# Patient Record
Sex: MALE | Race: WHITE | NOT HISPANIC OR LATINO | Employment: OTHER | URBAN - METROPOLITAN AREA
[De-identification: names, ages, dates, MRNs, and addresses within clinical notes are randomized per-mention and may not be internally consistent; named-entity substitution may affect disease eponyms.]

---

## 2017-10-18 ENCOUNTER — HOSPITAL ENCOUNTER (EMERGENCY)
Facility: HOSPITAL | Age: 32
Discharge: HOME | End: 2017-10-18
Payer: MEDICAID

## 2017-10-18 VITALS
SYSTOLIC BLOOD PRESSURE: 138 MMHG | BODY MASS INDEX: 23.1 KG/M2 | HEART RATE: 91 BPM | DIASTOLIC BLOOD PRESSURE: 80 MMHG | HEIGHT: 71 IN | RESPIRATION RATE: 20 BRPM | WEIGHT: 165 LBS | OXYGEN SATURATION: 100 % | TEMPERATURE: 98 F

## 2017-10-18 DIAGNOSIS — L02.415 ABSCESS OF RIGHT KNEE: ICD-10-CM

## 2017-10-18 DIAGNOSIS — L02.91 ABSCESS OF MULTIPLE SITES: Primary | ICD-10-CM

## 2017-10-18 DIAGNOSIS — L02.415 ABSCESS OF RIGHT LOWER LEG: ICD-10-CM

## 2017-10-18 DIAGNOSIS — L02.415 ABSCESS OF RIGHT THIGH: ICD-10-CM

## 2017-10-18 PROCEDURE — 25000003 PHARM REV CODE 250: Performed by: PHYSICIAN ASSISTANT

## 2017-10-18 PROCEDURE — 10061 I&D ABSCESS COMP/MULTIPLE: CPT

## 2017-10-18 PROCEDURE — 99283 EMERGENCY DEPT VISIT LOW MDM: CPT | Mod: 25

## 2017-10-18 RX ORDER — DICLOFENAC SODIUM 75 MG/1
75 TABLET, DELAYED RELEASE ORAL 2 TIMES DAILY PRN
Qty: 14 TABLET | Refills: 0 | Status: SHIPPED | OUTPATIENT
Start: 2017-10-18 | End: 2017-10-25

## 2017-10-18 RX ORDER — SULFAMETHOXAZOLE AND TRIMETHOPRIM 800; 160 MG/1; MG/1
1 TABLET ORAL
Status: COMPLETED | OUTPATIENT
Start: 2017-10-18 | End: 2017-10-18

## 2017-10-18 RX ORDER — SULFAMETHOXAZOLE AND TRIMETHOPRIM 800; 160 MG/1; MG/1
1 TABLET ORAL EVERY 12 HOURS
Qty: 20 TABLET | Refills: 0 | Status: SHIPPED | OUTPATIENT
Start: 2017-10-18 | End: 2017-10-28

## 2017-10-18 RX ORDER — HYDROCODONE BITARTRATE AND ACETAMINOPHEN 10; 325 MG/1; MG/1
1 TABLET ORAL
Status: COMPLETED | OUTPATIENT
Start: 2017-10-18 | End: 2017-10-18

## 2017-10-18 RX ORDER — LIDOCAINE HYDROCHLORIDE 10 MG/ML
10 INJECTION INFILTRATION; PERINEURAL
Status: COMPLETED | OUTPATIENT
Start: 2017-10-18 | End: 2017-10-18

## 2017-10-18 RX ADMIN — SULFAMETHOXAZOLE AND TRIMETHOPRIM 1 TABLET: 800; 160 TABLET ORAL at 07:10

## 2017-10-18 RX ADMIN — HYDROCODONE BITARTRATE AND ACETAMINOPHEN 1 TABLET: 10; 325 TABLET ORAL at 08:10

## 2017-10-18 RX ADMIN — LIDOCAINE HYDROCHLORIDE 10 ML: 10 INJECTION, SOLUTION INFILTRATION; PERINEURAL at 07:10

## 2017-10-18 NOTE — ED PROVIDER NOTES
SCRIBE #1 NOTE: I, Armen Rodriguez, am scribing for, and in the presence of, ALEXIS Rivera. I have scribed the entire note.      History      Chief Complaint   Patient presents with    Abscess     to R knee noticed them 2 days ago       Review of patient's allergies indicates:  No Known Allergies     HPI   HPI    10/18/2017, 6:51 PM   History obtained from the patient      History of Present Illness: Brian Small is a 32 y.o. male patient who presents to the Emergency Department for an evaluation of multiple skin abscesses to his right leg onset gradually 2 days ago. Pt reports associated redness to sites. Pt denies any fever, chills, drainage, weakness/numbness, and all other sx. He reports moderate pain. He states that they are getting worse. He has had Staph infections in the past. No pre-arrival treatment. Normal ROM to knee.     Arrival mode: Personal vehicle     PCP: Primary Doctor No       Past Medical History:  Past Medical History:   Diagnosis Date    Olecranon bursitis        Past Surgical History:  History reviewed. No pertinent surgical history.      Family History:  History reviewed. No pertinent surgical history.      Social History:  Social History     Social History Main Topics    Smoking status: Current Every Day Smoker    Smokeless tobacco: Never Used    Alcohol use Yes    Drug use: No    Sexual activity: Unknown       ROS   Review of Systems   Constitutional: Negative for chills, fever and unexpected weight change.   HENT: Negative for facial swelling, sore throat, trouble swallowing and voice change.    Respiratory: Negative for cough and shortness of breath.    Cardiovascular: Negative for chest pain and leg swelling.   Gastrointestinal: Negative for diarrhea, nausea and vomiting.   Endocrine: Negative for polydipsia and polyuria.   Musculoskeletal: Negative for arthralgias, gait problem and joint swelling.   Skin: Positive for color change.        (+)Abscess  (+) Redness  (-)  Drainage   Allergic/Immunologic: Negative for immunocompromised state.   Neurological: Negative for dizziness, weakness, light-headedness, numbness and headaches.   Hematological: Negative for adenopathy.   Psychiatric/Behavioral: Negative for confusion.     Physical Exam      Initial Vitals [10/18/17 1828]   BP Pulse Resp Temp SpO2   (!) 142/81 93 18 98.3 °F (36.8 °C) 100 %      MAP       101.33          Physical Exam  Nursing Notes and Vital Signs Reviewed.  Constitutional: Patient is in moderate distress. Well-developed and well-nourished.  Head: Normocephalic.  Eyes: PERRL. EOM intact. Conjunctivae are not pale. No scleral icterus.  ENT: Mucous membranes are moist.  Cardiovascular: Regular rate. Regular rhythm.  Pulmonary/Chest: No respiratory distress.  Abdominal: Soft and non-distended.   Musculoskeletal: Moves all extremities. No septic joint.     There is a cutaneous abscess noted to anterior aspect of distal right thigh; tender, erythematous, fluctuant, and measuring approximately 3cm in diameter.     There is a cutaneous abscess noted to lateral aspect of right knee; tender, erythematous, fluctuant, and pointing; measuring approximately 3 cm in diameter; already draining.     There is a cutaneous abscess noted to right lower leg; tender, erythematous, fluctuant, measuring about 1cm in diameter.    Skin: Warm and dry. No cellulitis. No lymphangitis. No necrosis.   Neurological:  Alert, awake, and appropriate. Normal speech. No acute focal neurological deficits are appreciated.  Psychiatric: Normal affect. Good eye contact. Appropriate in content.    ED Course    I & D - Incision and Drainage  Date/Time: 10/18/2017 7:47 PM  Performed by: STORMY WINSTON  Authorized by: CHRISTIANE CHIANG   Type: abscess  Body area: lower extremity (Right lateral lower leg)  Anesthesia: local infiltration    Anesthesia:  Local Anesthetic: lidocaine 1% without epinephrine  Scalpel size: 11  Incision type: single  "straight  Complexity: simple  Drainage: purulent  Drainage amount: scant  Wound treatment: incision,  drainage and  expression of material  Packing material: none  Complications: No  Specimens: No  Patient tolerance: Patient tolerated the procedure well with no immediate complications    I & D - Incision and Drainage  Date/Time: 10/18/2017 7:50 PM  Performed by: STORMY WINSTON  Authorized by: CHRISTIANE CHIANG   Type: abscess  Body area: lower extremity (Distal right thigh, superior to knee)  Anesthesia: local infiltration    Anesthesia:  Local Anesthetic: lidocaine 1% without epinephrine  Scalpel size: 11  Incision type: single straight  Complexity: complex  Drainage: purulent  Drainage amount: moderate  Wound treatment: incision,  wound packed and  deloculation  Packing material: 1/4 in gauze  Complications: No  Specimens: No  Patient tolerance: Patient tolerated the procedure well with no immediate complications    I & D - Incision and Drainage  Date/Time: 10/18/2017 8:23 PM  Performed by: STORMY WINSTON  Authorized by: CHRISTIANE CHIANG   Type: abscess  Body area: lower extremity (Lateral aspect of right knee)  Anesthesia: local infiltration    Anesthesia:  Local Anesthetic: lidocaine 1% without epinephrine  Scalpel size: 11  Incision type: single straight  Complexity: complex  Drainage: purulent  Drainage amount: moderate  Wound treatment: incision,  drainage,  wound packed and  deloculation  Packing material: 1/4 in gauze  Complications: No  Specimens: No  Patient tolerance: Patient tolerated the procedure well with no immediate complications        ED Vital Signs:  Vitals:    10/18/17 1828 10/18/17 2026   BP: (!) 142/81 138/80   Pulse: 93 91   Resp: 18 20   Temp: 98.3 °F (36.8 °C)    TempSrc: Oral    SpO2: 100% 100%   Weight: 74.8 kg (165 lb)    Height: 5' 11" (1.803 m)               The Emergency Provider reviewed the vital signs and test results, which are outlined above.    ED " Discussion     8:09 PM: Reassessed pt at this time. Pt is awake, alert, and in NAD. Pt states his condition has improved at this time. Discussed with pt all pertinent ED information. Discussed pt dx of  Abscess of right thigh and plan of tx. Gave pt all f/u and return to the ED instructions. All questions and concerns were addressed at this time. Pt expresses understanding of information and instructions, and is comfortable with plan to discharge. Pt is stable for discharge.    I discussed wound care precautions with patient and/or family/caretaker; specifically that all wounds have risk of infection despite efforts to cleanse and debride the wound; and there is a risk of an occult foreign body (and thus increased risk of infection) despite a negative examination.  I discussed with patient need to return for any signs of infection, specifically redness, increased pain, fever, drainage of pus, or any concern, immediately.      ED Medication(s):  Medications   lidocaine HCL 10 mg/ml (1%) injection 10 mL (10 mLs Infiltration Given 10/18/17 1929)   sulfamethoxazole-trimethoprim 800-160mg per tablet 1 tablet (1 tablet Oral Given 10/18/17 1930)   hydrocodone-acetaminophen 10-325mg per tablet 1 tablet (1 tablet Oral Given 10/18/17 2021)       Discharge Medication List as of 10/18/2017  8:16 PM      START taking these medications    Details   diclofenac (VOLTAREN) 75 MG EC tablet Take 1 tablet (75 mg total) by mouth 2 (two) times daily as needed (PAIN)., Starting Wed 10/18/2017, Until Wed 10/25/2017, Print      sulfamethoxazole-trimethoprim 800-160mg (BACTRIM DS) 800-160 mg Tab Take 1 tablet by mouth every 12 (twelve) hours., Starting Wed 10/18/2017, Until Sat 10/28/2017, Print             Follow-up Information     [facility] -  In 1 day.    Specialty:  Emergency Medicine  Why:  For wound re-check and packing removal.  Contact information:  [address] [facility]  [address]  [phone]                   Medical Decision Making              Scribe Attestation:   Scribe #1: I performed the above scribed service and the documentation accurately describes the services I performed. I attest to the accuracy of the note.    Attending:   Physician Attestation Statement for Scribe #1: I, ALEXIS Rivera, personally performed the services described in this documentation, as scribed by Armen Rodriguez, in my presence, and it is both accurate and complete.          Clinical Impression       ICD-10-CM ICD-9-CM   1. Abscess of multiple sites L02.91 682.9   2. Abscess of right thigh L02.415 682.6   3. Abscess of right knee L02.415 682.6   4. Abscess of right lower leg L02.415 682.6       Disposition:   Disposition: Discharged  Condition: Stable  Discussed proper wound care. Advised patient to return to the ER in 24 hours for wound re-check and packing removal.               Jasbir Cuevas PA-C  10/18/17 5801

## 2018-04-13 ENCOUNTER — HOSPITAL ENCOUNTER (EMERGENCY)
Facility: HOSPITAL | Age: 33
Discharge: HOME | End: 2018-04-13
Attending: EMERGENCY MEDICINE
Payer: MEDICAID

## 2018-04-13 VITALS
SYSTOLIC BLOOD PRESSURE: 150 MMHG | BODY MASS INDEX: 23.8 KG/M2 | RESPIRATION RATE: 18 BRPM | WEIGHT: 170 LBS | OXYGEN SATURATION: 99 % | DIASTOLIC BLOOD PRESSURE: 97 MMHG | HEIGHT: 71 IN | TEMPERATURE: 99 F | HEART RATE: 93 BPM

## 2018-04-13 DIAGNOSIS — K04.7 DENTAL ABSCESS: Primary | ICD-10-CM

## 2018-04-13 PROCEDURE — 41800 DRAINAGE OF GUM LESION: CPT

## 2018-04-13 PROCEDURE — 99283 EMERGENCY DEPT VISIT LOW MDM: CPT | Mod: 25

## 2018-04-13 PROCEDURE — 40800 DRAINAGE OF MOUTH LESION: CPT

## 2018-04-13 PROCEDURE — 63600175 PHARM REV CODE 636 W HCPCS: Performed by: EMERGENCY MEDICINE

## 2018-04-13 RX ORDER — TRAMADOL HYDROCHLORIDE 50 MG/1
50 TABLET ORAL EVERY 6 HOURS PRN
Qty: 12 TABLET | Refills: 0 | Status: SHIPPED | OUTPATIENT
Start: 2018-04-13 | End: 2019-11-02 | Stop reason: CLARIF

## 2018-04-13 RX ORDER — PENICILLIN V POTASSIUM 500 MG/1
500 TABLET, FILM COATED ORAL 4 TIMES DAILY
Qty: 40 TABLET | Refills: 0 | Status: SHIPPED | OUTPATIENT
Start: 2018-04-13 | End: 2018-04-20

## 2018-04-13 RX ADMIN — BENZOCAINE: 200 SPRAY DENTAL; ORAL; PERIODONTAL at 11:04

## 2018-04-13 NOTE — ED PROVIDER NOTES
SCRIBE #1 NOTE: I, Ollie Berrios, am scribing for, and in the presence of, Nate Jones MD. I have scribed the entire note.      History      Chief Complaint   Patient presents with    Facial Swelling     L cheek x2d       Review of patient's allergies indicates:  No Known Allergies     HPI   HPI    4/13/2018, 10:55 AM   History obtained from the patient      History of Present Illness: Brian Small is a 33 y.o. male patient who presents to the Emergency Department for left sided facial swelling which onset gradually 2 days ago. Symptoms are constant and moderate in severity.  No mitigating or exacerbating factors reported. Associated sxs include left sided dental pain. Patient denies any fever, chills, n/v/d, sore throat, drooling, dysphagia, and all other sxs at this time. Pt reports that he was unable to see his PCP, which prompted the ED visit. No further complaints or concerns at this time.         Arrival mode:  Personal vehicle    PCP: Primary Doctor No       Past Medical History:  Past Medical History:   Diagnosis Date    Olecranon bursitis        Past Surgical History:  Hx reviewed, not pertinent    Family History:  Hx reviewed, not pertinent    Social History:  Social History     Social History Main Topics    Smoking status: Current Every Day Smoker    Smokeless tobacco: Never Used    Alcohol use Yes    Drug use: No    Sexual activity: Not on file       ROS   Review of Systems   Constitutional: Negative for chills and fever.   HENT: Positive for dental problem and facial swelling (Left). Negative for drooling, sore throat and trouble swallowing.    Respiratory: Negative for shortness of breath.    Cardiovascular: Negative for chest pain.   Gastrointestinal: Negative for abdominal pain, diarrhea, nausea and vomiting.   Genitourinary: Negative for difficulty urinating and urgency.   Neurological: Negative for dizziness, weakness, light-headedness, numbness and headaches.   All other systems  reviewed and are negative.      Physical Exam      Initial Vitals [18 1029]   BP Pulse Resp Temp SpO2   (!) 150/97 93 18 98.9 °F (37.2 °C) 99 %      MAP       114.67          Physical Exam  Nursing Notes and Vital Signs Reviewed.  Constitutional: Patient is in no acute distress. Well-developed and well-nourished.  Head: Atraumatic. Normocephalic.  Eyes: PERRL. EOM intact. Conjunctivae are not pale. No scleral icterus.  ENT: Mucous membranes are moist. Oropharynx is clear and symmetric.    Mouth/Throat: Obvious left sided facial swelling. Tenderness and fluctuance noted over left lower mandible. Patient handles secretions normally. No palpable fluctuance. No evidence of periodontal or periapical abscess. No trismus.   Neck: Supple. Full ROM. No lymphadenopathy.  Cardiovascular: Regular rate. Regular rhythm. No murmurs, rubs, or gallops. Distal pulses are 2+ and symmetric.  Pulmonary/Chest: No respiratory distress. Clear to auscultation bilaterally. No wheezing or rales.  Abdominal: Soft and non-distended.  There is no tenderness.  No rebound, guarding, or rigidity. Good bowel sounds.  Musculoskeletal: Moves all extremities. No obvious deformities. No edema. No calf tenderness.  Skin: Warm and dry.  Neurological:  Alert, awake, and appropriate.  Normal speech.  No acute focal neurological deficits are appreciated.  Psychiatric: Normal affect. Good eye contact. Appropriate in content.    ED Course    I & D - Incision and Drainage  Date/Time: 2018 11:15 AM  Performed by: OZZIE LUNA  Authorized by: OZZIE LUNA   Consent Done: Yes  Consent: Verbal consent obtained.  Consent given by: patient  Patient identity confirmed: name and   Type: abscess  Body area: mouth    Anesthesia:  Local anesthetic: benzocaine 20 % mouth spray   Scalpel size: 11  Incision type: single straight  Complexity: simple  Drainage: pus and  bloody  Drainage amount: scant  Wound treatment: incision and  drainage  Complications:  "No  Specimens: No  Implants: No  Patient tolerance: Patient tolerated the procedure well with no immediate complications        ED Vital Signs:  Vitals:    04/13/18 1029   BP: (!) 150/97   Pulse: 93   Resp: 18   Temp: 98.9 °F (37.2 °C)   TempSrc: Oral   SpO2: 99%   Weight: 77.1 kg (170 lb)   Height: 5' 11" (1.803 m)            The Emergency Provider reviewed the vital signs and test results, which are outlined above.    ED Discussion     10:58 AM: Pt declines any narcotics for pain control at this time. Discussed with pt all pertinent ED information and results. Discussed pt dx and plan of tx. Gave pt all f/u and return to the ED instructions. All questions and concerns were addressed at this time. Pt expresses understanding of information and instructions, and is comfortable with plan to discharge. Pt is stable for discharge.    I discussed with patient and/or family/caretaker that evaluation in the ED does not suggest any emergent or life threatening medical conditions requiring immediate intervention beyond what was provided in the ED, and I believe patient is safe for discharge.  Regardless, an unremarkable evaluation in the ED does not preclude the development or presence of a serious of life threatening condition. As such, patient was instructed to return immediately for any worsening or change in current symptoms.      ED Medication(s):  Medications   benzocaine 20 % mouth spray ( Mouth/Throat Given 4/13/18 1104)       New Prescriptions    PENICILLIN V POTASSIUM (VEETID) 500 MG TABLET    Take 1 tablet (500 mg total) by mouth 4 (four) times daily.    TRAMADOL (ULTRAM) 50 MG TABLET    Take 1 tablet (50 mg total) by mouth every 6 (six) hours as needed for Pain.       Follow-up Information     Primary Doctor No. Schedule an appointment as soon as possible for a visit in 3 days.    Why:  Follow up with your primary doctor in the next 2-3 days for a re-check.   See your dentist ASAP as discussed.   Return to the " emergency department for any worsening signs or symptoms.                   Medical Decision Making              Scribe Attestation:   Scribe #1: I performed the above scribed service and the documentation accurately describes the services I performed. I attest to the accuracy of the note.    Attending:   Physician Attestation Statement for Scribe #1: I, Nate Jones MD, personally performed the services described in this documentation, as scribed by Ollie Berrios, in my presence, and it is both accurate and complete.          Clinical Impression       ICD-10-CM ICD-9-CM   1. Dental abscess K04.7 522.5       Disposition:   Disposition: Discharged  Condition: Stable         Nate Jones MD  04/13/18 1140

## 2018-04-16 ENCOUNTER — HOSPITAL ENCOUNTER (EMERGENCY)
Facility: HOSPITAL | Age: 33
Discharge: HOME | End: 2018-04-16
Payer: MEDICAID

## 2018-04-16 VITALS
OXYGEN SATURATION: 98 % | RESPIRATION RATE: 18 BRPM | TEMPERATURE: 98 F | SYSTOLIC BLOOD PRESSURE: 142 MMHG | HEART RATE: 86 BPM | HEIGHT: 71 IN | DIASTOLIC BLOOD PRESSURE: 96 MMHG | WEIGHT: 169.63 LBS | BODY MASS INDEX: 23.75 KG/M2

## 2018-04-16 DIAGNOSIS — R22.0 FACIAL SWELLING: ICD-10-CM

## 2018-04-16 DIAGNOSIS — K04.7 DENTAL ABSCESS: Primary | ICD-10-CM

## 2018-04-16 PROCEDURE — 25000003 PHARM REV CODE 250: Performed by: REGISTERED NURSE

## 2018-04-16 PROCEDURE — 99283 EMERGENCY DEPT VISIT LOW MDM: CPT | Mod: 25

## 2018-04-16 PROCEDURE — S0077 INJECTION, CLINDAMYCIN PHOSP: HCPCS | Performed by: REGISTERED NURSE

## 2018-04-16 PROCEDURE — 96372 THER/PROPH/DIAG INJ SC/IM: CPT

## 2018-04-16 RX ORDER — IBUPROFEN 800 MG/1
800 TABLET ORAL EVERY 6 HOURS PRN
Qty: 20 TABLET | Refills: 0 | Status: SHIPPED | OUTPATIENT
Start: 2018-04-16 | End: 2019-11-02 | Stop reason: CLARIF

## 2018-04-16 RX ORDER — CLINDAMYCIN PHOSPHATE 150 MG/ML
600 INJECTION, SOLUTION INTRAVENOUS
Status: COMPLETED | OUTPATIENT
Start: 2018-04-16 | End: 2018-04-16

## 2018-04-16 RX ORDER — CLINDAMYCIN HYDROCHLORIDE 300 MG/1
300 CAPSULE ORAL EVERY 6 HOURS
Qty: 28 CAPSULE | Refills: 0 | Status: SHIPPED | OUTPATIENT
Start: 2018-04-16 | End: 2018-04-23

## 2018-04-16 RX ADMIN — CLINDAMYCIN PHOSPHATE 600 MG: 150 INJECTION, SOLUTION INTRAMUSCULAR; INTRAVENOUS at 01:04

## 2018-04-16 NOTE — ED PROVIDER NOTES
History      Chief Complaint   Patient presents with    Dental Abscess     to L side of mouth       Review of patient's allergies indicates:  No Known Allergies     HPI   HPI    4/16/2018, 1:13 AM   History obtained from the patient      History of Present Illness: Brian Small is a 33 y.o. male patient who presents to the Emergency Department for dental pain and facial swelling which onset gradually 3 days ago. Pt was seen here on 4/13 and discharged. Pt has been taking clindamycin and tramadol for pain. Symptoms are constant and moderate in severity. No mitigating or exacerbating factors reported. Associated sxs include painful chewing. Patient denies any fever, NVD, and all other sxs at this time. No further complaints or concerns at this time.         Arrival mode: Personal vehicle      PCP: Primary Doctor No       Past Medical History:  Past Medical History:   Diagnosis Date    Olecranon bursitis        Past Surgical History:  History reviewed. No pertinent surgical history.      Family History:  Family History   Problem Relation Age of Onset    No Known Problems Mother     No Known Problems Father        Social History:  Social History     Social History Main Topics    Smoking status: Current Every Day Smoker    Smokeless tobacco: Never Used    Alcohol use Yes    Drug use: No    Sexual activity: Not on file       ROS   Review of Systems   Constitutional: Negative for fever.   HENT: Positive for dental problem and facial swelling. Negative for sore throat.    Respiratory: Negative for shortness of breath.    Cardiovascular: Negative for chest pain.   Gastrointestinal: Negative for nausea.   Genitourinary: Negative for dysuria.   Musculoskeletal: Negative for back pain.   Skin: Negative for rash.   Neurological: Negative for weakness.   Hematological: Does not bruise/bleed easily.   All other systems reviewed and are negative.      Physical Exam      Initial Vitals [04/16/18 0109]   BP Pulse Resp  "Temp SpO2   (!) 163/94 83 18 97.7 °F (36.5 °C) 99 %      MAP       117          Physical Exam  Nursing Notes and Vital Signs Reviewed.  Constitutional: Patient is in no acute distress. Well-developed and well-nourished.  Head: Atraumatic. Normocephalic.  Eyes: PERRL. EOM intact. Conjunctivae are not pale. No scleral icterus.  ENT: Mucous membranes are moist. Oropharynx is clear and symmetric.    Mouth/Throat: L lower facial swelling. Patient handles secretions normally. Positive for dental caries. Positive for gingival edema. No palpable fluctuance. No trismus.   Neck: Supple. Full ROM. No lymphadenopathy.  Cardiovascular: Regular rate. Regular rhythm. No murmurs, rubs, or gallops. Distal pulses are 2+ and symmetric.  Pulmonary/Chest: No respiratory distress. Clear to auscultation bilaterally. No wheezing or rales.  Abdominal: Soft and non-distended.  There is no tenderness.  No rebound, guarding, or rigidity. Good bowel sounds.  Genitourinary: No CVA tenderness  Musculoskeletal: Moves all extremities. No obvious deformities. No edema. No calf tenderness.  Skin: Warm and dry.  Neurological:  Alert, awake, and appropriate.  Normal speech.  No acute focal neurological deficits are appreciated.  Psychiatric: Normal affect. Good eye contact. Appropriate in content.    ED Course    Procedures  ED Vital Signs:  Vitals:    04/16/18 0109 04/16/18 0142   BP: (!) 163/94 (!) 142/96   Pulse: 83 86   Resp: 18 18   Temp: 97.7 °F (36.5 °C)    TempSrc: Oral    SpO2: 99% 98%   Weight: 76.9 kg (169 lb 10.3 oz)    Height: 5' 11" (1.803 m)        Abnormal Lab Results:  Labs Reviewed - No data to display     All Lab Results:      Imaging Results:  Imaging Results    None                 The Emergency Provider reviewed the vital signs and test results, which are outlined above.    ED Discussion     1:18 AM:  Discussed with pt all pertinent ED information and results. Discussed pt dx and plan of tx. Gave pt all f/u and return to the ED " instructions. All questions and concerns were addressed at this time. Pt expresses understanding of information and instructions, and is comfortable with plan to discharge. Pt is stable for discharge.    Pre-hypertension/Hypertension: The pt has been informed that they may have pre-hypertension or hypertension based on a blood pressure reading in the ED. I recommend that the pt call the PCP listed on their discharge instructions or a physician of their choice this week to arrange f/u for further evaluation of possible pre-hypertension or hypertension.           ED Medication(s):  Medications   clindamycin injection 600 mg (600 mg Intramuscular Given 4/16/18 0121)       Discharge Medication List as of 4/16/2018  1:19 AM      START taking these medications    Details   clindamycin (CLEOCIN) 300 MG capsule Take 1 capsule (300 mg total) by mouth every 6 (six) hours., Starting Mon 4/16/2018, Until Mon 4/23/2018, Print             Follow-up Information     Dentist of your choosing In 1 week.                   Medical Decision Making                        Clinical Impression       ICD-10-CM ICD-9-CM   1. Dental abscess K04.7 522.5   2. Facial swelling R22.0 784.2               Diony Jorge Jr., FNP  04/16/18 1200

## 2019-11-02 ENCOUNTER — HOSPITAL ENCOUNTER (EMERGENCY)
Facility: HOSPITAL | Age: 34
Discharge: HOME | End: 2019-11-02
Attending: FAMILY MEDICINE
Payer: MEDICAID

## 2019-11-02 VITALS
TEMPERATURE: 98 F | BODY MASS INDEX: 22.5 KG/M2 | OXYGEN SATURATION: 99 % | HEART RATE: 81 BPM | RESPIRATION RATE: 18 BRPM | WEIGHT: 160.69 LBS | SYSTOLIC BLOOD PRESSURE: 137 MMHG | DIASTOLIC BLOOD PRESSURE: 80 MMHG | HEIGHT: 71 IN

## 2019-11-02 DIAGNOSIS — K04.7 DENTAL ABSCESS: Primary | ICD-10-CM

## 2019-11-02 DIAGNOSIS — R03.0 ELEVATED BLOOD PRESSURE READING: ICD-10-CM

## 2019-11-02 DIAGNOSIS — K08.89 DENTALGIA: ICD-10-CM

## 2019-11-02 PROCEDURE — 99284 EMERGENCY DEPT VISIT MOD MDM: CPT

## 2019-11-02 RX ORDER — IBUPROFEN 800 MG/1
800 TABLET ORAL 3 TIMES DAILY PRN
Qty: 20 TABLET | Refills: 0 | Status: SHIPPED | OUTPATIENT
Start: 2019-11-02

## 2019-11-02 RX ORDER — PENICILLIN V POTASSIUM 500 MG/1
500 TABLET, FILM COATED ORAL 4 TIMES DAILY
Qty: 28 TABLET | Refills: 0 | Status: SHIPPED | OUTPATIENT
Start: 2019-11-02 | End: 2019-11-09

## 2019-11-02 NOTE — ED PROVIDER NOTES
"   History      Chief Complaint   Patient presents with    Dental Pain     pt states, "I have an abscess," pt c/o R lower jaw dental pain, x4 days       Review of patient's allergies indicates:  No Known Allergies     HPI   HPI    11/2/2019, 11:16 AM   History obtained from the patient      History of Present Illness: Brian Small is a 34 y.o. male patient who presents to the Emergency Department for dental pain x 3-4 days.  Patient complains of right lower dental pain.  Patient states that he has been told he needs to have a tooth pulled but has yet to schedule appointment.  Patient states that he does not have a dentist.  Denies fever, vomiting, diarrhea, chest pain, SOB, headache, dizziness, difficulty swallowing, difficulty breathing.  Patient reports improvement in pain and swelling with Ibuprofen.        Arrival mode: Personal vehicle      PCP: Primary Doctor No       Past Medical History:  Past Medical History:   Diagnosis Date    Olecranon bursitis        Past Surgical History:  History reviewed. No pertinent surgical history.      Family History:  Family History   Problem Relation Age of Onset    No Known Problems Mother     No Known Problems Father        Social History:  Social History     Tobacco Use    Smoking status: Current Some Day Smoker     Types: Cigarettes    Smokeless tobacco: Never Used   Substance and Sexual Activity    Alcohol use: Yes    Drug use: No    Sexual activity: Not on file       ROS   Review of Systems   Constitutional: Negative for chills and fever.   HENT: Positive for dental problem and facial swelling. Negative for congestion and rhinorrhea.    Eyes: Negative for discharge and redness.   Respiratory: Negative for cough and wheezing.    Cardiovascular: Negative for chest pain and palpitations.   Gastrointestinal: Negative for diarrhea, nausea and vomiting.   Genitourinary: Negative for dysuria and frequency.   Musculoskeletal: Negative for back pain and neck pain. " "  Skin: Negative for rash and wound.   Neurological: Negative for dizziness and headaches.       Physical Exam      Initial Vitals [11/02/19 1109]   BP Pulse Resp Temp SpO2   137/80 81 18 97.8 °F (36.6 °C) 99 %      MAP       --          Physical Exam  Nursing Notes and Vital Signs Reviewed.  Constitutional: Patient is in no apparent distress. Awake and alert. Well-developed and well-nourished.  Head: Atraumatic. Normocephalic.  Eyes: PERRL. EOM intact. Conjunctivae are not pale. No scleral icterus.  ENT: Mucous membranes are moist. Oropharynx is clear and symmetric.  Uvula midline.  Multiple dental caries.  Handling secretions without difficulty.  Mild swelling of right cheek.  No erythema or swelling of gingiva.    Neck: Supple. Full ROM. No lymphadenopathy.  Cardiovascular: Regular rate. Regular rhythm. No murmurs, rubs, or gallops. Distal pulses are 2+ and symmetric.  Pulmonary/Chest: No respiratory distress. Clear to auscultation bilaterally. No wheezing, rales, or rhonchi.  Abdominal: Soft and non-distended.  There is no tenderness.  No rebound, guarding, or rigidity.   Musculoskeletal: Moves all extremities. No obvious deformities. No edema. No calf tenderness.  Skin: Warm and dry.  Neurological:  Alert, awake, and appropriate.  Normal speech.  No acute focal neurological deficits are appreciated.  Psychiatric: Normal affect. Good eye contact. Appropriate in content.    ED Course    Procedures  ED Vital Signs:  Vitals:    11/02/19 1109   BP: 137/80   Pulse: 81   Resp: 18   Temp: 97.8 °F (36.6 °C)   TempSrc: Oral   SpO2: 99%   Weight: 72.9 kg (160 lb 11.5 oz)   Height: 5' 11" (1.803 m)       Abnormal Lab Results:  Labs Reviewed - No data to display     All Lab Results:  None    Imaging Results:  Imaging Results    None                 The Emergency Provider reviewed the vital signs and test results, which are outlined above.    ED Discussion     11:22 AM:  Discussed with pt all pertinent ED information and " results. Discussed pt dx and plan of tx. Gave pt all f/u and return to the ED instructions. All questions and concerns were addressed at this time. Pt expresses understanding of information and instructions, and is comfortable with plan to discharge. Pt is stable for discharge.    I discussed with patient and/or family/caretaker that evaluation in the ED does not suggest any emergent or life threatening medical conditions requiring immediate intervention beyond what was provided in the ED, and I believe patient is safe for discharge.  Regardless, an unremarkable evaluation in the ED does not preclude the development or presence of a serious of life threatening condition. As such, patient was instructed to return immediately for any worsening or change in current symptoms.    Pre-hypertension/Hypertension: The pt has been informed that they may have pre-hypertension or hypertension based on a blood pressure reading in the ED. I recommend that the pt call the PCP listed on their discharge instructions or a physician of their choice this week to arrange f/u for further evaluation of possible pre-hypertension or hypertension.       ED Medication(s):  Medications - No data to display    New Prescriptions    IBUPROFEN (ADVIL,MOTRIN) 800 MG TABLET    Take 1 tablet (800 mg total) by mouth 3 (three) times daily as needed for Pain.    PENICILLIN V POTASSIUM (VEETID) 500 MG TABLET    Take 1 tablet (500 mg total) by mouth 4 (four) times daily. for 7 days       Follow-up Information     [facility] Schedule an appointment as soon as possible for a visit in 3 days.    Why:  Dentist  Contact information:  [address]  [place] [address]    Phone:  [phone]           [facility] - .    Specialty:  Emergency Medicine  Why:  If symptoms worsen  Contact information:  [address] [facility] [address] [phone]                   Medical Decision Making                   Clinical Impression       ICD-10-CM ICD-9-CM   1. Dental abscess K04.7 522.5   2. Dentalgia K08.89 525.9   3. Elevated blood pressure reading R03.0 796.2       Disposition:   Disposition: Discharged  Condition: Stable           Nafisa Del Castillo PA-C  11/02/19 1127

## 2019-11-02 NOTE — ED NOTES
Patient examined, evaluated, and educated on discharge instructions and prescriptions by ALEXIS Skelton, without nursing assistance. Patient discharge to lobby by provider.

## 2021-08-26 ENCOUNTER — HOSPITAL ENCOUNTER (EMERGENCY)
Facility: HOSPITAL | Age: 36
Discharge: HOME | End: 2021-08-26
Attending: FAMILY MEDICINE
Payer: MEDICAID

## 2021-08-26 VITALS
DIASTOLIC BLOOD PRESSURE: 69 MMHG | WEIGHT: 153 LBS | SYSTOLIC BLOOD PRESSURE: 114 MMHG | HEART RATE: 73 BPM | BODY MASS INDEX: 21.42 KG/M2 | TEMPERATURE: 98 F | RESPIRATION RATE: 18 BRPM | OXYGEN SATURATION: 99 % | HEIGHT: 71 IN

## 2021-08-26 DIAGNOSIS — M70.22 OLECRANON BURSITIS OF LEFT ELBOW: Primary | ICD-10-CM

## 2021-08-26 PROCEDURE — 99283 EMERGENCY DEPT VISIT LOW MDM: CPT

## 2021-08-26 RX ORDER — SULFAMETHOXAZOLE AND TRIMETHOPRIM 800; 160 MG/1; MG/1
1 TABLET ORAL 2 TIMES DAILY
Qty: 14 TABLET | Refills: 0 | Status: SHIPPED | OUTPATIENT
Start: 2021-08-26 | End: 2021-09-02

## 2022-11-03 ENCOUNTER — HOSPITAL ENCOUNTER (EMERGENCY)
Facility: HOSPITAL | Age: 37
Discharge: HOME | End: 2022-11-03
Attending: EMERGENCY MEDICINE
Payer: MEDICAID

## 2022-11-03 VITALS
BODY MASS INDEX: 22.32 KG/M2 | OXYGEN SATURATION: 100 % | SYSTOLIC BLOOD PRESSURE: 120 MMHG | DIASTOLIC BLOOD PRESSURE: 84 MMHG | RESPIRATION RATE: 19 BRPM | HEART RATE: 78 BPM | TEMPERATURE: 98 F | WEIGHT: 160 LBS

## 2022-11-03 DIAGNOSIS — S99.929A FOOT INJURY: ICD-10-CM

## 2022-11-03 DIAGNOSIS — S91.332A PUNCTURE WOUND TO FOOT, LEFT, INITIAL ENCOUNTER: Primary | ICD-10-CM

## 2022-11-03 PROCEDURE — 90714 TD VACC NO PRESV 7 YRS+ IM: CPT | Performed by: NURSE PRACTITIONER

## 2022-11-03 PROCEDURE — 90471 IMMUNIZATION ADMIN: CPT | Performed by: NURSE PRACTITIONER

## 2022-11-03 PROCEDURE — 63600175 PHARM REV CODE 636 W HCPCS: Performed by: NURSE PRACTITIONER

## 2022-11-03 PROCEDURE — 99284 EMERGENCY DEPT VISIT MOD MDM: CPT

## 2022-11-03 RX ORDER — KETOROLAC TROMETHAMINE 10 MG/1
10 TABLET, FILM COATED ORAL 3 TIMES DAILY
Qty: 15 TABLET | Refills: 0 | Status: SHIPPED | OUTPATIENT
Start: 2022-11-03 | End: 2022-11-08

## 2022-11-03 RX ORDER — LEVOFLOXACIN 500 MG/1
500 TABLET, FILM COATED ORAL DAILY
Qty: 5 TABLET | Refills: 0 | Status: SHIPPED | OUTPATIENT
Start: 2022-11-03 | End: 2022-11-08

## 2022-11-03 RX ADMIN — CLOSTRIDIUM TETANI TOXOID ANTIGEN (FORMALDEHYDE INACTIVATED) AND CORYNEBACTERIUM DIPHTHERIAE TOXOID ANTIGEN (FORMALDEHYDE INACTIVATED) 0.5 ML: 5; 2 INJECTION, SUSPENSION INTRAMUSCULAR at 01:11

## 2022-11-03 NOTE — Clinical Note
"Brian"Marino Small was seen and treated in our emergency department on 11/3/2022.  He may return to work on 11/05/2022.       If you have any questions or concerns, please don't hesitate to call.      Ricardo Marcum NP"

## 2022-11-03 NOTE — ED NOTES
Patient identifiers verified and correct for Brian JEREMIE Small.    Pt present to er with nail puncture to l foot.    LOC: The patient is awake, alert and aware of environment with an appropriate affect, the patient is oriented x 3 and speaking appropriately.  APPEARANCE: Patient resting comfortably and in no acute distress, patient is clean and well groomed, patient's clothing is properly fastened.  SKIN: The skin is warm and dry, color consistent with ethnicity, patient has normal skin turgor and moist mucus membranes, skin intact, no breakdown or bruising noted.  MUSCULOSKELETAL: Patient moving all extremities spontaneously.  RESPIRATORY: Airway is open and patent, respirations are spontaneous.  CARDIAC: Patient has a normal rate, no periphreal edema noted, capillary refill < 3 seconds.  ABDOMEN: Soft and non tender to palpation.

## 2022-11-06 NOTE — ED PROVIDER NOTES
Encounter Date: 11/3/2022       History     Chief Complaint   Patient presents with    Foot Pain     Left foot pain after stepping on a nail yesterday; redness and swelling to left foot. Unknown last tetanus shot.      Patient complains of left foot pain after stepping on a nail yesterday that went through his boot.    The history is provided by the patient.   Foot Pain  This is a new problem. The current episode started yesterday. The problem occurs constantly. The problem has not changed since onset.Pertinent negatives include no chest pain and no shortness of breath. Nothing aggravates the symptoms. Nothing relieves the symptoms. He has tried nothing for the symptoms.   Review of patient's allergies indicates:  No Known Allergies  Past Medical History:   Diagnosis Date    Olecranon bursitis      No past surgical history on file.  Family History   Problem Relation Age of Onset    No Known Problems Mother     No Known Problems Father      Social History     Tobacco Use    Smoking status: Some Days     Types: Cigarettes    Smokeless tobacco: Never   Substance Use Topics    Alcohol use: Yes    Drug use: No     Review of Systems   Constitutional:  Negative for fever.   HENT:  Negative for sore throat.    Respiratory:  Negative for shortness of breath.    Cardiovascular:  Negative for chest pain.   Gastrointestinal:  Negative for nausea.   Genitourinary:  Negative for dysuria.   Musculoskeletal:  Negative for back pain.   Skin:  Negative for rash.   Neurological:  Negative for weakness.   Hematological:  Does not bruise/bleed easily.     Physical Exam     Initial Vitals [11/03/22 1306]   BP Pulse Resp Temp SpO2   120/84 78 19 97.8 °F (36.6 °C) 100 %      MAP       --         Physical Exam    Nursing note and vitals reviewed.  Constitutional: He appears well-developed and well-nourished.   HENT:   Head: Normocephalic and atraumatic.   Eyes: Conjunctivae are normal. Pupils are equal, round, and reactive to light.   Neck:  Neck supple.   Normal range of motion.  Cardiovascular:  Normal rate, regular rhythm, normal heart sounds and intact distal pulses.           Pulmonary/Chest: Breath sounds normal.   Abdominal: Abdomen is soft. There is no rebound and no guarding.   Musculoskeletal:         General: Normal range of motion.      Cervical back: Normal range of motion and neck supple.     Neurological: He is alert.   Skin: Skin is warm and dry.   Psychiatric: He has a normal mood and affect. His behavior is normal. Thought content normal.       ED Course   Procedures  Labs Reviewed - No data to display       Imaging Results              X-Ray Foot Complete Left (Final result)  Result time 11/03/22 13:48:26      Final result by Nikita Bearden MD (11/03/22 13:48:26)                   Impression:      No acute finding involving the left foot.      Electronically signed by: Nikita Bearden  Date:    11/03/2022  Time:    13:48               Narrative:    EXAMINATION:  XR FOOT COMPLETE 3 VIEW LEFT    CLINICAL HISTORY:  .  Unspecified injury of unspecified foot, initial encounter    TECHNIQUE:  AP, lateral and oblique views of the left foot were performed.    COMPARISON:  None available.    FINDINGS:  Three views of the left foot.    No acute fracture or dislocation.  The Lisfranc joint is maintained.                                       Medications   tetanus and diphther. tox (PF)(TD) (0.5 mLs Intramuscular Given 11/3/22 1320)                              Clinical Impression:   Final diagnoses:  [S99.929A] Foot injury  [S99.929A] Foot injury - nail puncture  [S91.332A] Puncture wound to foot, left, initial encounter (Primary)        ED Disposition Condition    Discharge Stable          ED Prescriptions       Medication Sig Dispense Start Date End Date Auth. Provider    levoFLOXacin (LEVAQUIN) 500 MG tablet Take 1 tablet (500 mg total) by mouth once daily. for 5 days 5 tablet 11/3/2022 11/8/2022 Ricardo Marcum NP    ketorolac (TORADOL)  10 mg tablet Take 1 tablet (10 mg total) by mouth 3 (three) times daily. for 5 days 15 tablet 11/3/2022 11/8/2022 Ricardo Marcum NP          Follow-up Information       Follow up With Specialties Details Why Contact Info    pcp  Schedule an appointment as soon as possible for a visit  As needed              Ricardo Marcum NP  11/06/22 1121       Ricardo Marcum NP  11/06/22 1122

## 2023-02-27 ENCOUNTER — HOSPITAL ENCOUNTER (EMERGENCY)
Facility: HOSPITAL | Age: 38
Discharge: HOME | End: 2023-02-27
Attending: EMERGENCY MEDICINE
Payer: MEDICAID

## 2023-02-27 VITALS
DIASTOLIC BLOOD PRESSURE: 69 MMHG | RESPIRATION RATE: 16 BRPM | OXYGEN SATURATION: 98 % | HEART RATE: 84 BPM | TEMPERATURE: 99 F | HEIGHT: 71 IN | SYSTOLIC BLOOD PRESSURE: 119 MMHG | WEIGHT: 163.13 LBS | BODY MASS INDEX: 22.84 KG/M2

## 2023-02-27 DIAGNOSIS — L03.116 CELLULITIS OF LEFT LOWER EXTREMITY: Primary | ICD-10-CM

## 2023-02-27 PROCEDURE — 25000003 PHARM REV CODE 250: Performed by: NURSE PRACTITIONER

## 2023-02-27 PROCEDURE — 99283 EMERGENCY DEPT VISIT LOW MDM: CPT

## 2023-02-27 RX ORDER — CLINDAMYCIN PHOSPHATE 150 MG/ML
600 INJECTION, SOLUTION INTRAVENOUS
Status: DISCONTINUED | OUTPATIENT
Start: 2023-02-27 | End: 2023-02-27 | Stop reason: RX

## 2023-02-27 RX ORDER — CLINDAMYCIN HYDROCHLORIDE 150 MG/1
450 CAPSULE ORAL 3 TIMES DAILY
Qty: 63 CAPSULE | Refills: 0 | Status: SHIPPED | OUTPATIENT
Start: 2023-02-27 | End: 2023-03-06

## 2023-02-27 RX ORDER — CLINDAMYCIN HYDROCHLORIDE 150 MG/1
600 CAPSULE ORAL
Status: COMPLETED | OUTPATIENT
Start: 2023-02-27 | End: 2023-02-27

## 2023-02-27 RX ADMIN — CLINDAMYCIN HYDROCHLORIDE 600 MG: 150 CAPSULE ORAL at 07:02

## 2023-02-28 NOTE — ED PROVIDER NOTES
HISTORY     Chief Complaint   Patient presents with    Leg Swelling     Pt arrives c/o left left swelling to anterior shin. Recently injured site with , not treated with antibiotics after injury. +Redness/swelling/warmth to touch. Ambulatory with steady gait noted. +PMS. Denies fevers/chills.     Review of patient's allergies indicates:  No Known Allergies     HPI   37-year-old male presents to the ER for redness to his left lower leg which has been ongoing times several days.  Patient reports recent injury with a lawnmower blade.  Patient reports his Tdap is up-to-date.  Patient denies any previous treatment.  Patient denies any fever, chills, abdominal pain, nausea, vomiting, chest pain, shortness of breath, and all other concerns at this time.    The history is provided by the patient. No  was used.      PCP: Primary Doctor No     Past Medical History:  Past Medical History:   Diagnosis Date    Olecranon bursitis         Past Surgical History:  No past surgical history on file.     Family History:  Family History   Problem Relation Age of Onset    No Known Problems Mother     No Known Problems Father         Social History:  Social History     Tobacco Use    Smoking status: Some Days     Types: Cigarettes    Smokeless tobacco: Never   Substance and Sexual Activity    Alcohol use: Yes    Drug use: No    Sexual activity: Not on file         ROS   Review of Systems   Constitutional:  Negative for fever.   HENT:  Negative for sore throat.    Respiratory:  Negative for shortness of breath.    Cardiovascular:  Negative for chest pain.   Gastrointestinal:  Negative for abdominal pain, nausea and vomiting.   Genitourinary:  Negative for dysuria.   Musculoskeletal:  Negative for back pain.   Skin:  Positive for wound. Negative for rash.   Neurological:  Negative for weakness.   Hematological:  Does not bruise/bleed easily.     PHYSICAL EXAM     Initial Vitals [02/27/23 1908]   BP Pulse  "Resp Temp SpO2   119/69 84 16 98.7 °F (37.1 °C) 98 %      MAP       --           Physical Exam    Nursing note and vitals reviewed.  Constitutional: He appears well-developed and well-nourished. He is not diaphoretic. No distress.   HENT:   Head: Normocephalic and atraumatic.   Eyes: Right eye exhibits no discharge. Left eye exhibits no discharge.   Neck: Neck supple.   Normal range of motion.  Cardiovascular:  Normal rate.           Pulmonary/Chest: No respiratory distress.   Abdominal: He exhibits no distension.   Musculoskeletal:         General: Normal range of motion.      Cervical back: Normal range of motion and neck supple.     Neurological: He is alert and oriented to person, place, and time. He has normal strength.   Skin: Skin is warm and dry.   5 cm area of erythema noted to the anterior left lower leg.  There is no fluctuance.  There is no drainage.   Psychiatric: He has a normal mood and affect. His behavior is normal. Thought content normal.        ED COURSE   Procedures  ED ONGOING VITALS:  Vitals:    02/27/23 1906 02/27/23 1908   BP:  119/69   Pulse:  84   Resp:  16   Temp:  98.7 °F (37.1 °C)   TempSrc:  Oral   SpO2:  98%   Weight:  74 kg (163 lb 2.3 oz)   Height: 5' 11" (1.803 m)          ABNORMAL LAB VALUES:  Labs Reviewed - No data to display      ALL LAB VALUES:  none      RADIOLOGY STUDIES:  Imaging Results    None                   The above vital signs and test results have been reviewed by the emergency provider.     ED Medications:  Medications   clindamycin capsule 600 mg (600 mg Oral Given 2/27/23 1941)       Discharge Medication List as of 2/27/2023  8:20 PM        START taking these medications    Details   clindamycin (CLEOCIN) 150 MG capsule Take 3 capsules (450 mg total) by mouth 3 (three) times daily. for 7 days, Starting Mon 2/27/2023, Until Mon 3/6/2023, Print           Discharge Medications:  Discharge Medication List as of 2/27/2023  8:20 PM        START taking these medications "    Details   clindamycin (CLEOCIN) 150 MG capsule Take 3 capsules (450 mg total) by mouth 3 (three) times daily. for 7 days, Starting Mon 2/27/2023, Until Mon 3/6/2023, Print             Follow-up Information       pcp of choice.    Why: As needed             [facility] - Emergency Dept..    Specialty: Emergency Medicine  Why: If symptoms worsen  Contact information:  [address] [facility] [address] [phone]                          I discussed with patient and/or family/caretaker that evaluation in the ED does not suggest any emergent or life threatening medical conditions requiring immediate intervention beyond what was provided in the ED, and I believe patient is safe for discharge. Regardless, an unremarkable evaluation in the ED does not preclude the development or presence of a serious or life threatening condition. As such, patient was instructed to return immediately for any worsening or change in current symptoms.      MEDICAL DECISION MAKING   Medical Decision Making:   Initial Assessment:   Patient presents the ER for erythema noted to the left lower extremity consistent with cellulitis.  Differential Diagnosis:   Abscess  Urticaria  ED Management:  Patient has symptoms consistent with cellulitis.  Will treat patient outpatient with clindamycin.  Patient to follow with PCP and return to the ER for any worsening concerns.             CLINICAL IMPRESSION       ICD-10-CM ICD-9-CM   1. Cellulitis of left lower extremity  L03.116 682.6       Disposition:   Disposition: Discharged  Condition: Stable       Nirav Winchester NP  02/27/23 2110

## 2023-05-16 ENCOUNTER — HOSPITAL ENCOUNTER (EMERGENCY)
Facility: HOSPITAL | Age: 38
Discharge: HOME | End: 2023-05-16
Attending: EMERGENCY MEDICINE
Payer: MEDICAID

## 2023-05-16 VITALS
HEART RATE: 77 BPM | OXYGEN SATURATION: 100 % | WEIGHT: 162.06 LBS | RESPIRATION RATE: 16 BRPM | HEIGHT: 71 IN | BODY MASS INDEX: 22.69 KG/M2 | DIASTOLIC BLOOD PRESSURE: 84 MMHG | TEMPERATURE: 99 F | SYSTOLIC BLOOD PRESSURE: 118 MMHG

## 2023-05-16 DIAGNOSIS — L03.115 CELLULITIS OF RIGHT KNEE: Primary | ICD-10-CM

## 2023-05-16 PROCEDURE — 99283 EMERGENCY DEPT VISIT LOW MDM: CPT

## 2023-05-16 PROCEDURE — 25000003 PHARM REV CODE 250: Performed by: REGISTERED NURSE

## 2023-05-16 RX ORDER — SULFAMETHOXAZOLE AND TRIMETHOPRIM 800; 160 MG/1; MG/1
1 TABLET ORAL
Status: COMPLETED | OUTPATIENT
Start: 2023-05-16 | End: 2023-05-16

## 2023-05-16 RX ORDER — SULFAMETHOXAZOLE AND TRIMETHOPRIM 800; 160 MG/1; MG/1
1 TABLET ORAL 2 TIMES DAILY
Qty: 14 TABLET | Refills: 0 | Status: SHIPPED | OUTPATIENT
Start: 2023-05-16 | End: 2023-05-23

## 2023-05-16 RX ADMIN — SULFAMETHOXAZOLE AND TRIMETHOPRIM 1 TABLET: 800; 160 TABLET ORAL at 05:05

## 2023-05-16 NOTE — ED PROVIDER NOTES
Encounter Date: 5/16/2023       History     Chief Complaint   Patient presents with    Wound Infection     Pt reports wound to right knee X 3-4 months.     38-year-old male presents emergency department with complaints of wound to the right knee that began several days ago.  Patient reports working on his knees prior to infection.  Patient states that a sac burst on the knee and has been draining.  Patient denies any fever, chills, nausea/vomiting, chest pain or any other symptoms at this time.    The history is provided by the patient.   Review of patient's allergies indicates:  No Known Allergies  Past Medical History:   Diagnosis Date    Olecranon bursitis      No past surgical history on file.  Family History   Problem Relation Age of Onset    No Known Problems Mother     No Known Problems Father      Social History     Tobacco Use    Smoking status: Some Days     Types: Cigarettes    Smokeless tobacco: Never   Substance Use Topics    Alcohol use: Yes    Drug use: No     Review of Systems   Constitutional:  Negative for fever.   HENT:  Negative for sore throat.    Respiratory:  Negative for shortness of breath.    Cardiovascular:  Negative for chest pain.   Gastrointestinal:  Negative for nausea.   Genitourinary:  Negative for dysuria.   Musculoskeletal:  Positive for arthralgias. Negative for back pain.   Skin:  Negative for rash.   Neurological:  Negative for weakness.   Hematological:  Does not bruise/bleed easily.   All other systems reviewed and are negative.    Physical Exam     Initial Vitals [05/16/23 1652]   BP Pulse Resp Temp SpO2   118/84 77 16 98.5 °F (36.9 °C) 100 %      MAP       --         Physical Exam    Constitutional: He appears well-developed and well-nourished. No distress.   HENT:   Head: Normocephalic and atraumatic.   Nose: Nose normal.   Mouth/Throat: Uvula is midline and oropharynx is clear and moist.   Eyes: Conjunctivae and EOM are normal. Pupils are equal, round, and reactive to  light.   Neck: Neck supple.   Normal range of motion.  Cardiovascular:  Normal rate and regular rhythm.           Pulmonary/Chest: Effort normal and breath sounds normal. No respiratory distress. He has no decreased breath sounds. He has no wheezes. He has no rales.   Abdominal: Abdomen is soft. Bowel sounds are normal. There is no abdominal tenderness.   Musculoskeletal:         General: Normal range of motion.      Cervical back: Normal range of motion and neck supple.        Legs:       Comments: 3 centimeter area of erythema with drainage to the right anterior lower extremity, full range of motion knee, no calf tenderness, negative Homans sign     Neurological: He is alert and oriented to person, place, and time. He has normal strength. GCS eye subscore is 4. GCS verbal subscore is 5. GCS motor subscore is 6.   Skin: Skin is warm and dry. Capillary refill takes less than 2 seconds. No rash noted.   Psychiatric: He has a normal mood and affect. His speech is normal and behavior is normal.       ED Course   Procedures  Labs Reviewed - No data to display       Imaging Results    None          Medications   sulfamethoxazole-trimethoprim 800-160mg per tablet 1 tablet (has no administration in time range)     Medical Decision Making:   ED Management:  Cellulitis to the right knee with drainage, will treat with Bactrim DS.  Return the emergency department for any worsening symptoms.                        Clinical Impression:   Final diagnoses:  [L03.115] Cellulitis of right knee (Primary)        ED Disposition Condition    Discharge Stable          ED Prescriptions       Medication Sig Dispense Start Date End Date Auth. Provider    sulfamethoxazole-trimethoprim 800-160mg (BACTRIM DS) 800-160 mg Tab Take 1 tablet by mouth 2 (two) times daily. for 7 days 14 tablet 5/16/2023 5/23/2023 Diony Jorge Jr., FNP          Follow-up Information       Follow up With Specialties Details Why Contact Info    [facility] -  Emergency Dept. Emergency Medicine  If symptoms worsen [address]  [facility] [id number] [phone]             Diony Jorge Jr., P  05/16/23 5750

## 2023-06-06 ENCOUNTER — HOSPITAL ENCOUNTER (EMERGENCY)
Facility: HOSPITAL | Age: 38
Discharge: HOME | End: 2023-06-06
Attending: FAMILY MEDICINE
Payer: MEDICAID

## 2023-06-06 VITALS
HEIGHT: 71 IN | BODY MASS INDEX: 22.1 KG/M2 | SYSTOLIC BLOOD PRESSURE: 141 MMHG | HEART RATE: 61 BPM | TEMPERATURE: 99 F | DIASTOLIC BLOOD PRESSURE: 81 MMHG | RESPIRATION RATE: 16 BRPM | WEIGHT: 157.88 LBS | OXYGEN SATURATION: 96 %

## 2023-06-06 DIAGNOSIS — Z51.89 VISIT FOR WOUND CHECK: Primary | ICD-10-CM

## 2023-06-06 PROCEDURE — 99283 EMERGENCY DEPT VISIT LOW MDM: CPT

## 2023-06-06 RX ORDER — SULFAMETHOXAZOLE AND TRIMETHOPRIM 800; 160 MG/1; MG/1
1 TABLET ORAL 2 TIMES DAILY
Qty: 14 TABLET | Refills: 0 | Status: SHIPPED | OUTPATIENT
Start: 2023-06-06 | End: 2023-06-13

## 2023-06-07 NOTE — ED PROVIDER NOTES
"Encounter Date: 6/6/2023       History     Chief Complaint   Patient presents with    Wound Check     Pt reports I&D to to right leg two weeks ago. Pt removed stitches "may have taken out to early."     Patient presents to ER for wound check.  Patient reports that he had an I and D performed to right anterior lower leg approximately 2 weeks ago.  He states the area had stitches which he self removed approximately 1 week ago.  He is here today for a wound check.  He states he has had minimal clear drainage from the site.  He denies any further concerns.  Denies fever, chills, generalized body aches, joint immobility, joint instability, numbness, tingling, worsening erythema.    The history is provided by the patient.   Review of patient's allergies indicates:  No Known Allergies  Past Medical History:   Diagnosis Date    Olecranon bursitis      No past surgical history on file.  Family History   Problem Relation Age of Onset    No Known Problems Mother     No Known Problems Father      Social History     Tobacco Use    Smoking status: Some Days     Types: Cigarettes    Smokeless tobacco: Never   Substance Use Topics    Alcohol use: Yes    Drug use: No     Review of Systems   Constitutional:  Negative for chills, fatigue and fever.   Respiratory:  Negative for cough and shortness of breath.    Cardiovascular:  Negative for chest pain and palpitations.   Gastrointestinal:  Negative for abdominal pain, nausea and vomiting.   Musculoskeletal:  Negative for back pain, myalgias and neck pain.   Skin:  Positive for wound. Negative for rash.   Neurological:  Negative for weakness and numbness.     Physical Exam     Initial Vitals [06/06/23 2146]   BP Pulse Resp Temp SpO2   (!) 141/81 61 16 98.6 °F (37 °C) 96 %      MAP       --         Physical Exam    Nursing note and vitals reviewed.  Constitutional: He appears well-developed and well-nourished. He is not diaphoretic. No distress.   HENT:   Head: Normocephalic and " atraumatic.   Eyes: EOM are normal.   Neck: Neck supple.   Normal range of motion.  Cardiovascular:  Normal rate, regular rhythm and intact distal pulses.           Pulmonary/Chest: No respiratory distress.   Musculoskeletal:         General: Normal range of motion.      Cervical back: Normal range of motion and neck supple.     Neurological: He is alert and oriented to person, place, and time. He has normal strength. No sensory deficit. GCS score is 15. GCS eye subscore is 4. GCS verbal subscore is 5. GCS motor subscore is 6.   Skin: Skin is warm and dry. Capillary refill takes less than 2 seconds.   +localized open wound noted to anterior right lower leg with minimal erythema noted.  No active drainage is noted.  Site is nontender to palpation.  Distal sensation is intact.  He is neurovascularly intact distally.  +full range of motion to right knee.  Patient is ambulatory without assistance or difficulty.       ED Course   Procedures  Labs Reviewed - No data to display       Imaging Results    None          Medications - No data to display               No sign of active infection noted to wound of right lower leg.  Discussed signs and symptoms to return to ER.  Discussed close follow-up with his PCP.  Discussed proper wound care at home.  Patient remains neurovascularly intact distally.  Distal pulses +2 and equal bilaterally.  He is ambulatory without difficulty or assistance.  There is no active drainage from site.  Site is nontender to palpation.  Will provide patient with Bactrim Rx for prophylactic antibiotic.  Patient agrees with plan and voices no further concerns.  I discussed with patient  that evaluation in the ED does not suggest any emergent or life threatening medical conditions requiring immediate intervention beyond what was provided in the ED, and I believe patient is safe for discharge. Regardless, an unremarkable evaluation in the ED does not preclude the development or presence of a serious of  life threatening condition. As such, patient was instructed to return immediately for any worsening or change in current symptoms.              Clinical Impression:   Final diagnoses:  [Z51.89] Visit for wound check (Primary)        ED Disposition Condition    Discharge Stable          ED Prescriptions       Medication Sig Dispense Start Date End Date Auth. Provider    sulfamethoxazole-trimethoprim 800-160mg (BACTRIM DS) 800-160 mg Tab Take 1 tablet by mouth 2 (two) times daily. for 7 days 14 tablet 6/6/2023 6/13/2023 Eyal Rubio NP          Follow-up Information       Follow up With Specialties Details Why Contact [facility]  In 2 days  [address] [facility] [address] [phone]      [facility] Dept. Emergency Medicine  If symptoms worsen, As needed [address] [facility] [id number] [phone]             Eyal Rubio NP  06/07/23 0135